# Patient Record
Sex: MALE | Race: BLACK OR AFRICAN AMERICAN | Employment: FULL TIME | URBAN - METROPOLITAN AREA
[De-identification: names, ages, dates, MRNs, and addresses within clinical notes are randomized per-mention and may not be internally consistent; named-entity substitution may affect disease eponyms.]

---

## 2022-09-05 ENCOUNTER — APPOINTMENT (OUTPATIENT)
Dept: CT IMAGING | Age: 27
End: 2022-09-05

## 2022-09-05 ENCOUNTER — HOSPITAL ENCOUNTER (EMERGENCY)
Age: 27
Discharge: HOME | End: 2022-09-05
Attending: EMERGENCY MEDICINE

## 2022-09-05 VITALS
DIASTOLIC BLOOD PRESSURE: 87 MMHG | WEIGHT: 264.55 LBS | RESPIRATION RATE: 20 BRPM | TEMPERATURE: 98.2 F | OXYGEN SATURATION: 98 % | SYSTOLIC BLOOD PRESSURE: 144 MMHG | HEART RATE: 70 BPM

## 2022-09-05 DIAGNOSIS — M54.16 LUMBAR RADICULOPATHY, ACUTE: Primary | ICD-10-CM

## 2022-09-05 PROCEDURE — 99284 EMERGENCY DEPT VISIT MOD MDM: CPT

## 2022-09-05 PROCEDURE — 6360000002 HC RX W HCPCS: Performed by: NURSE PRACTITIONER

## 2022-09-05 PROCEDURE — 96372 THER/PROPH/DIAG INJ SC/IM: CPT

## 2022-09-05 PROCEDURE — 72131 CT LUMBAR SPINE W/O DYE: CPT

## 2022-09-05 PROCEDURE — 6370000000 HC RX 637 (ALT 250 FOR IP): Performed by: NURSE PRACTITIONER

## 2022-09-05 RX ORDER — HYDROCODONE BITARTRATE AND ACETAMINOPHEN 5; 325 MG/1; MG/1
1 TABLET ORAL EVERY 6 HOURS PRN
Qty: 12 TABLET | Refills: 0 | Status: SHIPPED | OUTPATIENT
Start: 2022-09-05 | End: 2022-09-08

## 2022-09-05 RX ORDER — OXYCODONE HYDROCHLORIDE AND ACETAMINOPHEN 5; 325 MG/1; MG/1
1 TABLET ORAL ONCE
Status: COMPLETED | OUTPATIENT
Start: 2022-09-05 | End: 2022-09-05

## 2022-09-05 RX ORDER — METHOCARBAMOL 500 MG/1
1500 TABLET, FILM COATED ORAL ONCE
Status: COMPLETED | OUTPATIENT
Start: 2022-09-05 | End: 2022-09-05

## 2022-09-05 RX ORDER — PREDNISONE 20 MG/1
TABLET ORAL
Qty: 18 TABLET | Refills: 0 | Status: SHIPPED | OUTPATIENT
Start: 2022-09-05 | End: 2022-09-15

## 2022-09-05 RX ORDER — LIDOCAINE 4 G/G
1 PATCH TOPICAL ONCE
Status: DISCONTINUED | OUTPATIENT
Start: 2022-09-05 | End: 2022-09-05 | Stop reason: HOSPADM

## 2022-09-05 RX ORDER — METHOCARBAMOL 500 MG/1
500 TABLET, FILM COATED ORAL 4 TIMES DAILY
Qty: 40 TABLET | Refills: 0 | Status: SHIPPED | OUTPATIENT
Start: 2022-09-05 | End: 2022-09-15

## 2022-09-05 RX ORDER — LIDOCAINE 50 MG/G
1 PATCH TOPICAL DAILY
Qty: 10 PATCH | Refills: 0 | Status: SHIPPED | OUTPATIENT
Start: 2022-09-05 | End: 2022-09-15

## 2022-09-05 RX ORDER — KETOROLAC TROMETHAMINE 30 MG/ML
60 INJECTION, SOLUTION INTRAMUSCULAR; INTRAVENOUS ONCE
Status: COMPLETED | OUTPATIENT
Start: 2022-09-05 | End: 2022-09-05

## 2022-09-05 RX ORDER — PREDNISONE 20 MG/1
60 TABLET ORAL ONCE
Status: COMPLETED | OUTPATIENT
Start: 2022-09-05 | End: 2022-09-05

## 2022-09-05 RX ADMIN — OXYCODONE AND ACETAMINOPHEN 1 TABLET: 5; 325 TABLET ORAL at 11:36

## 2022-09-05 RX ADMIN — HYDROMORPHONE HYDROCHLORIDE 2 MG: 1 INJECTION, SOLUTION INTRAMUSCULAR; INTRAVENOUS; SUBCUTANEOUS at 13:53

## 2022-09-05 RX ADMIN — METHOCARBAMOL 1500 MG: 500 TABLET ORAL at 11:35

## 2022-09-05 RX ADMIN — KETOROLAC TROMETHAMINE 60 MG: 30 INJECTION, SOLUTION INTRAMUSCULAR at 11:37

## 2022-09-05 RX ADMIN — PREDNISONE 60 MG: 20 TABLET ORAL at 11:36

## 2022-09-05 ASSESSMENT — PAIN DESCRIPTION - FREQUENCY
FREQUENCY: INTERMITTENT
FREQUENCY: INTERMITTENT

## 2022-09-05 ASSESSMENT — PAIN DESCRIPTION - ORIENTATION
ORIENTATION: LEFT;LOWER
ORIENTATION: LEFT;LOWER
ORIENTATION: LEFT

## 2022-09-05 ASSESSMENT — PAIN - FUNCTIONAL ASSESSMENT
PAIN_FUNCTIONAL_ASSESSMENT: 0-10
PAIN_FUNCTIONAL_ASSESSMENT: PREVENTS OR INTERFERES SOME ACTIVE ACTIVITIES AND ADLS
PAIN_FUNCTIONAL_ASSESSMENT: PREVENTS OR INTERFERES SOME ACTIVE ACTIVITIES AND ADLS

## 2022-09-05 ASSESSMENT — PAIN DESCRIPTION - LOCATION
LOCATION: BACK

## 2022-09-05 ASSESSMENT — PAIN SCALES - GENERAL
PAINLEVEL_OUTOF10: 7
PAINLEVEL_OUTOF10: 3
PAINLEVEL_OUTOF10: 7
PAINLEVEL_OUTOF10: 7

## 2022-09-05 ASSESSMENT — PAIN DESCRIPTION - PAIN TYPE
TYPE: ACUTE PAIN
TYPE: ACUTE PAIN

## 2022-09-05 ASSESSMENT — PAIN DESCRIPTION - DESCRIPTORS
DESCRIPTORS: BURNING;DISCOMFORT
DESCRIPTORS: ACHING
DESCRIPTORS: ACHING

## 2022-09-05 NOTE — DISCHARGE INSTRUCTIONS
Do not take NSAIDs including but not limited to ibuprofen, Motrin, Advil, Aleve, naproxen, etc. while taking prednisone    Do not apply heating pads over the lidocaine patch as this will result in burns.     Do not take Tylenol or acetaminophen-containing products while taking Norco.  Sedation precautions while taking Norco.    Do not drink alcohol while taking Norco.    Do not drive or operate heavy machinery while taking Norco.

## 2022-09-05 NOTE — ED NOTES
Discharge and education instructions reviewed. Patient verbalized understanding, teach-back successful. Patient denied questions at this time. No acute distress noted. Patient instructed to follow-up as noted - return to emergency department if symptoms worsen. Patient verbalized understanding. Discharged per EDMD with discharged instructions.        Jossy Velazquez RN  09/05/22 2778

## 2022-09-05 NOTE — ED PROVIDER NOTES
[address]  [address] [address]  Dept: [phone]  Loc: [address] [place] [address] COMPLAINT    Chief Complaint   Patient presents with    Back Pain     Pt states that he woke up 2 days ago, and has pain from left buttocks to left calf        HPI    Sindy Ferrari is a 33 y.o. male who presents emergency department via EMS with complaints of left lower back pain that he describes as sharp shooting that goes down the middle of his left buttocks and down the mid posterior thigh stopping at the mid calf. He states that he developed this pain while he was lifting some heavy items at work on Thursday. He states the pain has gotten worse since then. He is unable to ambulate today. He denies any falls. He has never had this before. He denies saddle anesthesia, loss of bowel or bladder function, urinary retention, extremity weakness, numbness or paresthesias. He denies bodies, fever, chills. He denies history of diabetes. He denies IV drug use. REVIEW OF SYSTEMS    Musculoskeletal: see HPI, No blunt trauma  : No dysuria or hematuria  GI: No abdominal pain or vomiting  General: No fevers or chills  Neurologic: No bowel or bladder incontinence, No saddle anesthesia, No leg weakness  All other systems reviewed and are negative. PAST MEDICAL & SURGICAL HISTORY    No past medical history on file. No past surgical history on file.     CURRENT MEDICATIONS  (may include discharge medications prescribed in the ED)      ALLERGIES    No Known Allergies    SOCIAL HISTORY    Social History     Socioeconomic History    Marital status: Unknown       PHYSICAL EXAM    VITAL SIGNS: BP (!) 144/87   Pulse 70   Temp 98.2 °F (36.8 °C) (Oral)   Resp 20   Wt 264 lb 8.8 oz (120 kg)   SpO2 98%    Constitutional:  Well developed, well nourished, no acute distress  HENT:  Atraumatic,  Moist mucus membranes  Neck: No JVD, supple   Respiratory:  No respiratory distress, normal breath sounds  Cardiovascular: Regular rhythm and rate, S1-S2. No murmur  GI:  Soft, nontender, no pulsatile masses or bruits of the abdomen  Musculoskeletal:  No edema, no acute deformities    Back: No midline C-spine, T-spine or L-spine pain with palpation. No bony or maladies, step-offs or crepitance. left sciatic notch tenderness to palpation, lumbar paraspinous tenderness to palpation, + straight leg raise on the left  Integument:  Well hydrated   Vascular: Radial, pedal and posttibial pulses are 2+ equal bilaterally brisk capillary refill to fingers and toes  Neurologic:  Motor testing reveals: intact thigh adduction, knee flexion and extension, ankle dorsiflexion, toe plantarflexion, and great toe dorsiflexion bilaterally, patellar and Achilles tendon reflexes are 2+ and equal bilaterally, sensation to light touch is intact in the groin and lower extremities bilaterally. Patient is unable to bear weight on the left leg. RADIOLOGY  CT LUMBAR SPINE WO CONTRAST   Final Result   No acute osseous abnormality. Multilevel diffuse disc bulge causes spinal canal and neural foraminal   stenosis. Consider further evaluation with MRI. RECOMMENDATIONS:   Unavailable           Labs Reviewed - No data to display    ED COURSE & MEDICAL DECISION MAKING    Pertinent Labs & Imaging studies reviewed and interpreted.  (See chart for details)    Vitals:    09/05/22 1106 09/05/22 1530   BP: (!) 163/89 (!) 144/87   Pulse: 62 70   Resp: 20    Temp: 98.2 °F (36.8 °C)    TempSrc: Oral    SpO2: 100% 98%   Weight: 264 lb 8.8 oz (120 kg)      Medications   ketorolac (TORADOL) injection 60 mg (60 mg IntraMUSCular Given 9/5/22 1137)   methocarbamol (ROBAXIN) tablet 1,500 mg (1,500 mg Oral Given 9/5/22 1135)   oxyCODONE-acetaminophen (PERCOCET) 5-325 MG per tablet 1 tablet (1 tablet Oral Given 9/5/22 1136)   predniSONE (DELTASONE) tablet 60 mg (60 mg Oral Given 9/5/22 1136)   HYDROmorphone (DILAUDID) injection 2 mg (2 mg IntraMUSCular Given 9/5/22 5180)     Patient was seen evaluated by myself my attending physician Dr. Tiffani Keene    Differential Diagnosis: Septic hip joint, Fractured hip, Herniated lumbar disc, Epidural Abscess, Abdominal Aortic Aneurysm, Metastases to back, Cauda Equina Syndrome, Kidney stone, Pyelonephritis, other    Patient is afebrile and nontoxic in appearance. No evidence of neurological deficit on exam.    Patient was lifting heavy boxes at work on Thursday. He felt the pain in his back and over the weekend is gotten worse. Zamzam Neves like he was unable to ambulate today so he called EMS who brought him in. He was medicated initially with prednisone, Percocet, Robaxin, Toradol. On reevaluation the patient is able to stand upright and ambulate. He does still have some pain in the left lower back going down into his buttock and leg. He was given Dilaudid. CT of the L-spine  Impression   No acute osseous abnormality. Multilevel diffuse disc bulge causes spinal canal and neural foraminal   stenosis. Consider further evaluation with MRI. I spoke with neurosurgeon Dr. Chato Mcneil regarding this patient. He can be discharged today as he has not demonstrating any neurologic deficit. The patient should call the office in the morning and schedule an appointment and he will be seen this week for further evaluation. I will discharge him home with lidocaine with instructions to avoid ice packs and heating pads over any patches, Robaxin, prednisone with instructions to avoid any other NSAIDs and Norco with sedation precautions and instructions to avoid acetaminophen containing products. Red flag symptoms were reviewed with the patient and his mother when to return to the emergency department otherwise he can follow-up with Dr. Bonnie Fagan in the office this week.   Patient verbalized understanding of the discharge instructions and he ambulated out of the emergency department with a steady gait. Controlled Substance Monitoring:    Acute and Chronic Pain Monitoring:   RX Monitoring 9/5/2022   Periodic Controlled Substance Monitoring No signs of potential drug abuse or diversion identified. FINAL IMPRESSION    1.  Lumbar radiculopathy, acute        PLAN  Discharge with outpatient medications, follow-up, and discharge instructions (see EMR)      (Please note that this note was completed with a voice recognition program.  Every attempt was made to edit the dictations, but inevitably there remain words that are mis-transcribed.)       Jasmina Galloway, HARJEET - CNP  09/05/22 2397

## 2022-09-05 NOTE — ED PROVIDER NOTES
Attending Supervisory Note/Shared Visit   I have personally performed a face to face diagnostic evaluation on this patient. I have reviewed the mid-levels findings and agree. History and Exam by me shows male no acute distress. Planes of pain in his left buttock radiating to his left lower leg into his calf. Sudden in onset shortly after he did some lifting at work on Friday. He states it may have been 20 pounds he was lifting. The pain started after he did the lifting. He has no weakness. No numbness tingling or paresthesias. No previous history of back pain problems. No abdominal pain. No incontinence of urine or stool. General: Alert black male appears uncomfortable. Heart: Regular rate and rhythm. No murmurs or gallops noted. Lungs: Breath sounds equal bilaterally and clear. Abdomen: Obese, soft, nontender. Musculoskeletal: Poorly localized mid lumbar back pain with movement. No focal tenderness. No edema. Neuro: Negative straight leg raises bilaterally. 1+ symmetrical patellar and Achilles tendon reflexes. Intact sensation to touch. CT lumbar spine without contrast: No acute abnormality. Multilevel diffuse disc bulge causing spinal canal and neuroforaminal stenosis. This involves L3-4, L4-5, and L5-S1. Neurosurgery was consulted. They did not feel that emergent MRI was indicated. He will be treated for lumbar radiculopathy and follow-up in the office. If further imaging is indicated they can order. Diagnosis: Lumbar radiculopathy    Disposition: Discharged home.       (Please note that portions of this note were completed with a voice recognition program.  Efforts were made to edit the dictations but occasionally words are mis-transcribed.)    Mary Howard MD  Attending Emergency Physician        Ale Bates MD  09/05/22 6971

## 2022-09-05 NOTE — Clinical Note
Shira Perez was seen and treated in our emergency department on 9/5/2022. He may return to work on 09/09/2022. Or until cleared by Neurosurgeon     If you have any questions or concerns, please don't hesitate to call.       Bora Zelaya, APRHELENA - CNP

## 2022-09-05 NOTE — ED NOTES
Discharge and education instructions reviewed. Patient verbalized understanding, teach-back successful. Patient denied questions at this time. No acute distress noted. Patient instructed to follow-up as noted - return to emergency department if symptoms worsen. Patient verbalized understanding. Discharged per EDMD with discharged instructions.        Fransisco Hill RN  09/05/22 0358